# Patient Record
Sex: FEMALE | Race: WHITE
[De-identification: names, ages, dates, MRNs, and addresses within clinical notes are randomized per-mention and may not be internally consistent; named-entity substitution may affect disease eponyms.]

---

## 2021-12-14 ENCOUNTER — HOSPITAL ENCOUNTER (EMERGENCY)
Dept: HOSPITAL 35 - ER | Age: 61
LOS: 1 days | Discharge: PSYCHIATRIC OR SUBSTANCE USE FACILITY | End: 2021-12-15
Payer: OTHER GOVERNMENT

## 2021-12-14 ENCOUNTER — HOSPITAL ENCOUNTER (INPATIENT)
Dept: HOSPITAL 35 - SBH | Age: 61
LOS: 14 days | Discharge: HOME | DRG: 885 | End: 2021-12-28
Attending: PSYCHIATRY & NEUROLOGY | Admitting: PSYCHIATRY & NEUROLOGY
Payer: OTHER GOVERNMENT

## 2021-12-14 VITALS — HEIGHT: 64 IN | WEIGHT: 157 LBS | BODY MASS INDEX: 26.8 KG/M2

## 2021-12-14 DIAGNOSIS — F31.2: Primary | ICD-10-CM

## 2021-12-14 DIAGNOSIS — Z86.73: ICD-10-CM

## 2021-12-14 DIAGNOSIS — J44.9: ICD-10-CM

## 2021-12-14 DIAGNOSIS — K21.9: ICD-10-CM

## 2021-12-14 DIAGNOSIS — Z28.21: ICD-10-CM

## 2021-12-14 DIAGNOSIS — Z71.6: ICD-10-CM

## 2021-12-14 DIAGNOSIS — Z88.5: ICD-10-CM

## 2021-12-14 DIAGNOSIS — E87.1: ICD-10-CM

## 2021-12-14 DIAGNOSIS — F01.50: ICD-10-CM

## 2021-12-14 DIAGNOSIS — F41.9: ICD-10-CM

## 2021-12-14 DIAGNOSIS — I10: ICD-10-CM

## 2021-12-14 DIAGNOSIS — Z79.899: ICD-10-CM

## 2021-12-14 DIAGNOSIS — F10.97: ICD-10-CM

## 2021-12-14 DIAGNOSIS — Z79.82: ICD-10-CM

## 2021-12-14 DIAGNOSIS — F31.9: ICD-10-CM

## 2021-12-14 DIAGNOSIS — F91.1: Primary | ICD-10-CM

## 2021-12-14 DIAGNOSIS — Z88.6: ICD-10-CM

## 2021-12-14 DIAGNOSIS — F20.9: ICD-10-CM

## 2021-12-14 DIAGNOSIS — E78.5: ICD-10-CM

## 2021-12-14 DIAGNOSIS — Z20.822: ICD-10-CM

## 2021-12-14 PROCEDURE — 10880: CPT

## 2021-12-15 VITALS — DIASTOLIC BLOOD PRESSURE: 74 MMHG | SYSTOLIC BLOOD PRESSURE: 141 MMHG

## 2021-12-15 VITALS — DIASTOLIC BLOOD PRESSURE: 74 MMHG | SYSTOLIC BLOOD PRESSURE: 151 MMHG

## 2021-12-15 VITALS — SYSTOLIC BLOOD PRESSURE: 141 MMHG | DIASTOLIC BLOOD PRESSURE: 74 MMHG

## 2021-12-15 VITALS — SYSTOLIC BLOOD PRESSURE: 132 MMHG | DIASTOLIC BLOOD PRESSURE: 81 MMHG

## 2021-12-15 LAB
ANION GAP SERPL CALC-SCNC: 11 MMOL/L (ref 7–16)
BUN SERPL-MCNC: 4 MG/DL (ref 7–18)
CALCIUM SERPL-MCNC: 9.2 MG/DL (ref 8.5–10.1)
CHLORIDE SERPL-SCNC: 97 MMOL/L (ref 98–107)
CHOLEST SERPL-MCNC: 248 MG/DL (ref ?–200)
CO2 SERPL-SCNC: 24 MMOL/L (ref 21–32)
CREAT SERPL-MCNC: 0.5 MG/DL (ref 0.6–1)
GLUCOSE SERPL-MCNC: 87 MG/DL (ref 74–106)
HDLC SERPL-MCNC: 43 MG/DL (ref 40–?)
LDLC SERPL-MCNC: 174 MG/DL (ref ?–100)
POTASSIUM SERPL-SCNC: 3.9 MMOL/L (ref 3.5–5.1)
SERUM ASSESSMENT: (no result)
SODIUM SERPL-SCNC: 132 MMOL/L (ref 136–145)
TC:HDL: 5.8 RATIO
TRIGL SERPL-MCNC: 158 MG/DL (ref ?–150)
VIT B12 SERPL-MCNC: 1365 PG/ML (ref 193–986)
VLDLC SERPL CALC-MCNC: 32 MG/DL (ref ?–40)

## 2021-12-15 NOTE — NUR
ADMISSION SUMMARY
 
Pt was admitted to [facility] on 12/15/21 at 0037. Pt was cooperative with admission
process. Vital signs were obtained; pt was slighlty hypertensive with blood
pressure of 149/94, other vital signs were within normal limits. Skin
assessment performed; results unremarkable. RN will call DPOA in the morning
and obtain verbal consents.
 
During admission interview pt was emotionally labile; smiling and then crying.
Pt was oriented x4. Pt stated she feels guilt that she has to put her family
through another hospital admission. Pt was hypertalkative and very tangential.
Pt endorsed passive suicidal ideation with no plan. Pt admitted to attempting
suicide in 2017, but did not share how she attempted. Pt denied access to a
gun. Denied HI. Denied AVH, but stated that she does have "dream apparitions."

## 2021-12-15 NOTE — NUR
PATIENT WAS IN BED ASLEEP WHEN CARE ASSUMED. MORNING MEDICATION GIVEN AT BED
SIDE, WELL TOLERATED. MARY SERVED IN ROOM PER PROTOCOL TO SERVE NEW ADMITS
ROOM TRAYS. PATIENT IS ALERT, AND ORIENTED X 3-4, ABLE TO VOICE NEED. LCTA,
RESP EVEN/UNLABORED, NO SOA/CYANOSIS NOTED. BS+X4, ABD SOFT, NON-TENDER TO
TOUCH, APPETITE FAIR. PATIENT HAS SINCE GOTTEN UP, AMBULATE WITH ASSIST OF
ROLLER WALKER, GAIT SLIGHTLY UNSTEADY. PATIENT DENIES SUICIDAL/HOMICIDAL
IDEATION, SHE RATES BOTH DEPRESSION/ANXIETY 3/10. PATIENT REPORTS POOR SLEEP,
AFFECT IS LABILE, MOOD IS EUTHYMIC. PATIENT PARTICIPATES IN GROUP THERAPY. NO
SIGN OF ACUTE DISTRESS NOTED AT THIS TIME, WIIL MONITOR FOR SAFETY.

## 2021-12-16 VITALS — DIASTOLIC BLOOD PRESSURE: 75 MMHG | SYSTOLIC BLOOD PRESSURE: 125 MMHG

## 2021-12-16 VITALS — SYSTOLIC BLOOD PRESSURE: 125 MMHG | DIASTOLIC BLOOD PRESSURE: 75 MMHG

## 2021-12-16 VITALS — DIASTOLIC BLOOD PRESSURE: 73 MMHG | SYSTOLIC BLOOD PRESSURE: 167 MMHG

## 2021-12-16 VITALS — DIASTOLIC BLOOD PRESSURE: 102 MMHG | SYSTOLIC BLOOD PRESSURE: 172 MMHG

## 2021-12-16 LAB
EST. AVERAGE GLUCOSE BLD GHB EST-MCNC: 94 MG/DL
GLYCOHEMOGLOBIN (HGB A1C): 4.9 % (ref 4.8–5.6)
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
## 2021-12-16 NOTE — NUR
Alert and orientated X 4.  Denies SI/HI.  At times hyperverbal.  Requesting
shower several times in AM but then when offered one after AM group refused
stating she would wait until this evening.  Informed that may not be possible
d/t staffing and acuity of pts.  Verbalizes understanding.  Participating in
groups.  Breath sounds clear.  Reg HR auscultated.  Color pink with brisk
capillary refill and palpable peripheral pulses.  Independent with voiding.
Active bowel sounds over soft, rounded abdomen.  States she had BM yesterday.
Ambulates with walker with regular, steady gait.

## 2021-12-16 NOTE — NUR
ASSUMED CARE ON 12/15/21 @ 1900, A&OX3 CONFUSED AND RAPID SPEACH NOTED.
"REMIND ME TO TELL MY DAUGHTER TO SHOP @ [facility] FOR A LARGE CAT BED AND HAVE
IT DELIVERED BY DOOR DASH". COOPERATES WITH ASSESSMENT, HRRR BREATH SOUNDS CTA
BILAT, ABD N X4Q REPORTS LAST BM 12/14.  AMBULATES WITH A WALKER, HIGH FALL
RISK, RETIRED TO BED @ HS, BED ALARM SET, BED IS LOW TO GROUND WILL CONTINUE
TO MONITOR WITH FALL PROTOCOLS.

## 2021-12-17 VITALS — SYSTOLIC BLOOD PRESSURE: 136 MMHG | DIASTOLIC BLOOD PRESSURE: 86 MMHG

## 2021-12-17 VITALS — DIASTOLIC BLOOD PRESSURE: 83 MMHG | SYSTOLIC BLOOD PRESSURE: 145 MMHG

## 2021-12-17 VITALS — SYSTOLIC BLOOD PRESSURE: 169 MMHG | DIASTOLIC BLOOD PRESSURE: 113 MMHG

## 2021-12-17 NOTE — NUR
Resummed client care from overnight shift this am. Client was irritable,
hyperverbal, and somatic during this shift. Client presented oriented 3x,
though appeared disoriented to situation as she realizes she is in hospital,
but is making plans for a "non-profit organization which [her] daughters will
have no access to." Client presents with somatic symptoms, asking for multiple
medications, voicing concerns about her bottom being red (no redness noted),
and voicing anxiety while talking loquaciously and exhibiting hypomanic
thought process. Flight of ideas, and grandiosity present. Client denies any
depression or anxiety, but has became agitated and yelled at staff about phone
calls, medication requests, and her nonprofit organization, stating that she
needs to leave and take care of things.
 
Client trileptal increased to 300 mg BID, seroquel increased during this shift
for night time dose. Labs requested for this shift. Client concerns have been
voiced to Dr. Rush, her hospitalist, and Dr. Rush inspected her concerns of
redness along with this nurse to confirm no issue. Client informed of this.
Client concerns passed on to Dr. Rush, and Dr. Weiss for health and psych
concerns respectively.
 
Last BM was 12/17, though client requested MOM to help with bowel movements
this afternoon due to expressed bottom pain. Lung sounds clear; bowel
sounds present. Client is still continuing somatic behavior at this time and
is being redirected regularly by staff. No further concerns from client at
this time that have been unaddressed.

## 2021-12-17 NOTE — NUR
12-16-21 CARE TRANSFERRED 1900 OBSERVED PT WALKING IN HALLWAY WITH STEADY
GAIT. LATER PT AAOX4, VSS, RR EVEN AND NONLABORED ON RA, LUNGS CLEAR AND
DIMINISHED, HT RR, ABD SOFT/ACTIVE/NONTENDER. PT HYPERVERBAL WITH FLIGHT OF
IDEAS AND MOMENTS OF GRANDIOSE. PT DEMANDING AND IRRITABLE ABOUT SITUATION
WITH DAUGHTER. PT HAD NO DIFFICULTIES TAKING MEDICATION WHOLE WITH WATER. PT
BED WAS ADJUSTED FOR COMFORT, LOCKED AND LOWEST POSITION WITH ALARM ON. LATER
PT REPORTED PAIN AND WANTED OTC MEDICATION. UPON REASSESSMENT OF PAIN NOTED PT
RESTING WITH EYES CLOSED RESTING. PT WILL CONTINUE TO BE MONITOR PER [facility]
PROTOCOL.

## 2021-12-17 NOTE — NUR
12---4382--Saw patient in dayroom and stopped to speak to her after she
finished her breakfast.  She was looking at the chart for todays activities
and asked if she could go to her room.  I told her yes and that she needed to
be back in the dayroom by 9:00 for recreation therapy.  She was agreeable.

## 2021-12-17 NOTE — NUR
12---2301--Attended team meeting today for patient.  Has geen at [facility] several times.  Will talk to her re: this placement.

## 2021-12-18 VITALS — DIASTOLIC BLOOD PRESSURE: 82 MMHG | SYSTOLIC BLOOD PRESSURE: 145 MMHG

## 2021-12-18 VITALS — DIASTOLIC BLOOD PRESSURE: 81 MMHG | SYSTOLIC BLOOD PRESSURE: 137 MMHG

## 2021-12-18 LAB
ANION GAP SERPL CALC-SCNC: 7 MMOL/L (ref 7–16)
BUN SERPL-MCNC: 9 MG/DL (ref 7–18)
CALCIUM SERPL-MCNC: 9.8 MG/DL (ref 8.5–10.1)
CHLORIDE SERPL-SCNC: 93 MMOL/L (ref 98–107)
CO2 SERPL-SCNC: 29 MMOL/L (ref 21–32)
CREAT SERPL-MCNC: 0.7 MG/DL (ref 0.6–1)
GLUCOSE SERPL-MCNC: 99 MG/DL (ref 74–106)
POTASSIUM SERPL-SCNC: 4.3 MMOL/L (ref 3.5–5.1)
SODIUM SERPL-SCNC: 129 MMOL/L (ref 136–145)

## 2021-12-18 NOTE — NUR
12---6400--Patient was brought to my office.  I inquired if she had
taken her meds this AM.  Patient states she has taken them.  Patient is more
manic and hyperverbal than I have seen her in the days she has been here. She
acknowledges she is manicie and she feels great.
Attempted to talk to her about the note she put under my door.  It was
difficult to follow her conversation as she was very tangential and jumped
from subject to subject.  Prior to her going to the [facility] group I warned
Angie Harris about how manic she was and that she might not be able to sit
very long.
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
## 2021-12-18 NOTE — NUR
12---1430--Noise heard from in the dayroom.  Went to investigate.
Patient was once again agitated and was disrupting group.  Rec. therapist had
asked her multiple times to talk quietly, not talk or go to her room to talk
if she couldn't be quiet in group.  Patient was highly tangential and jumped
from topic to topic displlaying flight of ideas.  Her speech was rapid,
pressured and at time she displayed incoherent speech.  She appeared very
impulsive and highly grandiose stating she was going to "file a report with
the police when they came" (security had been called). She threatened all the
staff with law suits and would not allow anyone to interject anything to
attempt to defuse the situation.  She demanded she be allowed to call her
daughter and stated she wanted "out of here".  She is very impulsive and is
lacking in safety awareness (hers and the safety of others).  When the first
 arrived she continued to defy him and state she had a rght to
be here (in the day room) and she wasn't leaving.  A second guard arrived and
as they attempted to assist her to stand the patient was hanging onto the edge
of the table.  It was eventually pulled away from her and with a guard on each
side and hands under her arms they escorted her to her room.  All the while
she could be heard threatening to carlton the guards also. (Patient had PRN med at
approximately 1300 after beong argumentative and walking out of this
therapists group.)

## 2021-12-18 NOTE — NUR
12---5245--Note slid under my door this AM from patient that states:
RE: NEED DONE NOW--and the things that were listed were for her cat; and
needing all of her bills paid, etc.  Will talk to patient laterthis AM. (Note
was dated wth the correct date).

## 2021-12-18 NOTE — NUR
12-17-21 CARE TRANSFERRED 1900 OBSERVED PT SITTING IN DAY ROOM AT TABLE
WRITING. LATER PT AAOX4, ZONIA B/P 136/86, P 88, RR 16 EVEN AND NONLABORED ON
RA, PT DENIES PAIN AND SI/HI. PT COMMUNICATION AT A LOWER PACE TODAY, STILL
PRESENTING IN A MANIC STATE WITH FLIGHT OF IDEAS. PT VERBAL EXCHANGE WAS
APPROPRIATE. PT LUNGS CLEAR/DIMINSIHED, HT RR, ABD SOFT/ACTIVE. PT HAS BEEN
COOPERATIVE THROUGHOUT NURSING ASSESSMENT. DURING MEDICATION ADMIN PT HAD NO
DIFFICULTIES. LATER PT BED WAS ADJUSTED FOR COMFORT, LOCKED, LOW AND ALARM ON.
PT WILL CONTINUE TO BE MONITOR PER [facility] PROTOCOL.

## 2021-12-19 VITALS — SYSTOLIC BLOOD PRESSURE: 142 MMHG | DIASTOLIC BLOOD PRESSURE: 86 MMHG

## 2021-12-19 VITALS — SYSTOLIC BLOOD PRESSURE: 142 MMHG | DIASTOLIC BLOOD PRESSURE: 74 MMHG

## 2021-12-19 VITALS — DIASTOLIC BLOOD PRESSURE: 90 MMHG | SYSTOLIC BLOOD PRESSURE: 155 MMHG

## 2021-12-19 NOTE — NUR
RESUMMED CARE ROM OVERNIGHT SHIFT THIS AM, PATIENT IN ROOM LYING QUIET.
PATIENT ALERT ORIENTED TIMES 3 PATIENT DENIES SI/HI/AH/VH AT PRESENT. PATIENT
IS AT TIMES VERY HYPERVERBAL AND COMPLAINS ABOUT DIFFERENT THINGS. PATIENT ATE
BREAKFAST TOOK MEDICATION WITHOUT INCIDENCE. PATIENTS ABDOMEN SOFT BOWEL
SOUNDS PRESENT. PATIENTS LUNGS CLEAR PATIENT HAS A HISTORY OF COPD AND GETS
RESPIRATORY TREATMENTS. PATIENT STATES HER DEPRESSION AND ANXIETY IS A 2 NOW;
PATIENT PARTICPATES IN GROUPS. PATIENT HAS NOT DISPLAYED ANY BEHAVIORS AT
PRESENT. WILL CONTINUE TO MONITOR PATIENT FOR SAFETY AND BEHAVIORS.

## 2021-12-19 NOTE — NUR
12-18-21 CARE TRANSFERRED 1900. LATER PT AAOX4, VSS, RR EVEN AND NONLABORED
RA, LUNGS CLEAR/DIMINSHED, HT RR, ABD ACTIVE/SOFT/NONTENDER. PT DENIES PAIN
AND SI/HI. PT HYPERVERBAL BUT PACE SLOWER. PT REPORTS "GOING TO HAVE TO PLAY
THE GAME" PT OFTEN TRANSITION COVERSATION SHOWING FLIGHT OF IDEAS. PT %
HS SNACK. DURING MEDICATION ADMIN PT HAD NO DIFFICULTIES TAKING MEDICATION
WHOLE WITH WATER. OBSERVED PT MOVE FROM LYING TO SITTING POSITON WITH EASE, PT
HAS FULL ROM WITH STEADY GAIT. PT HAS BEEN USING COMMON MANNERS WITH REQUEST
THIS EVENING. PT BED WAS ADJUSTED FOR COMFORT, PT WILL CONTINUE TO BE MONITOR
PER [facility] PROTOCOL.

## 2021-12-20 VITALS — SYSTOLIC BLOOD PRESSURE: 171 MMHG | DIASTOLIC BLOOD PRESSURE: 92 MMHG

## 2021-12-20 VITALS — SYSTOLIC BLOOD PRESSURE: 146 MMHG | DIASTOLIC BLOOD PRESSURE: 83 MMHG

## 2021-12-20 NOTE — NUR
12-19-21 CARE TRANSFERRED 1900 OBSERVED PT WALKING IN HALLWAY. LATER PT AAOX4,
VSS, RR EVEN AND NONLABORED ON RA, PT DENIES PAIN AND SI/HI/VAH. PT
HYPERVERBAL AND SOMETIMES HAS FLIGHT OF IDEAS WITH GRANDIOSE, HAVE NOTED PT
COMMUNICATION EXCHANGE PACE HAS SLOWED. PT PLEASANT, CALM AND COOPERATIVE WITH
THIS WRITER, OBSERVED PT GETTING IRRITABLE WITH  ANOTHER STAFF MEMBER. LUNGS
CLEAR DIMINISHED, HT RR, ABD SOFT AND ACTIVE. DURNG MEDICATION ADMIN PT HAD NO
DIFFICULTIE TAKING MEDICATION WHOLE. LATER PT REPORTED BACK PAIN AND PRN
MEDICATIN ADMIN. UPON REASSESSMENT PT RESTING WITH EYES CLOSED. LATER NOTED PT
WALKING HALLWAYS, THEN TO ROOM AND RESTING IN BED. PT WILL CONTINUE TO BE
MONITOR PER [facility] PROTOCOL.

## 2021-12-20 NOTE — NUR
12---1845--Patient was in the dayroom waiting for group to start.  I
inquired how she was feeling today and she stated fine.  Her speech was much
less pressured and rapid than over the weekend.  She states her daughter Mery
(who is her DPOA) is no longer mad at her.

## 2021-12-20 NOTE — NUR
12---Call to [facility] where the patient was placed previously.
Talked to the adminisrator, Fausto.  He asked me to fax him a referral packet
and he would look at it.  Information gathered and faxed to [facility].

## 2021-12-20 NOTE — NUR
COOPERATIVE WITH AM ASSESSMENT AND MEDICATION PASS-UPON INITIAL APPROACH IS
SITTING AT TABLE IN DAYROOM VISITING WITH FEMALE PEER. SPEECH REMAINS
PRESSURED AT TIMES. CONVERSATION IS APPROPRIATE AND GOAL DIRECTED. AT NURSING
STATION X 2 SO FAR THIS SHIFT ASKING FOR PAPER AND OBSERVED TO BE MAKING
COPIOUS PAGES OF NOTES AND WRITINGS WHICH ARE DISORGANIZED. USING ROLLER
WALKER TO AMBULATE FROM ROOM TO DAYROOM. GAIT IS STEADY WITH ASSISITVE DEVICE.
REQUESTED AND RECEIVED IBUPROFEN 600MG PO PRN AT 1445 FOR MID BACK PAIN RATED
A 6 ON 1-10 SCALE.
 
TO

## 2021-12-21 VITALS — DIASTOLIC BLOOD PRESSURE: 79 MMHG | SYSTOLIC BLOOD PRESSURE: 167 MMHG

## 2021-12-21 VITALS — SYSTOLIC BLOOD PRESSURE: 138 MMHG | DIASTOLIC BLOOD PRESSURE: 71 MMHG

## 2021-12-21 VITALS — DIASTOLIC BLOOD PRESSURE: 71 MMHG | SYSTOLIC BLOOD PRESSURE: 138 MMHG

## 2021-12-21 LAB
ANION GAP SERPL CALC-SCNC: 10 MMOL/L (ref 7–16)
BUN SERPL-MCNC: 12 MG/DL (ref 7–18)
CALCIUM SERPL-MCNC: 9.7 MG/DL (ref 8.5–10.1)
CHLORIDE SERPL-SCNC: 91 MMOL/L (ref 98–107)
CO2 SERPL-SCNC: 27 MMOL/L (ref 21–32)
CREAT SERPL-MCNC: 0.7 MG/DL (ref 0.6–1)
GLUCOSE SERPL-MCNC: 112 MG/DL (ref 74–106)
POTASSIUM SERPL-SCNC: 4.2 MMOL/L (ref 3.5–5.1)
SODIUM SERPL-SCNC: 128 MMOL/L (ref 136–145)

## 2021-12-21 NOTE — NUR
Pt requesting ibuprofen for low back pain 3/10.  Would also like hydroxine for
anxiety 3/10.  None ordered.

## 2021-12-21 NOTE — NUR
12-20-21 CARE TRANSFERRED 1900. PT AAOX4, VSS, RR EVEN AND NONLABORED ON RA.
PT LUNGS CLEAR/DIMINISHED, HT RR, ABD SOFT. PT DENIES SI/HI AND PAIN. PT
HYPERVERBAL WITH FLIGHT OF IDEAS BUT COOPERATIVE.

## 2021-12-21 NOTE — NUR
RESUMMMED CARE FROM OVERNIGHT SHIFT THIS AM,  PATIENT IN ROOM SITTING QUIET ON
BED. PATIENT ALERT ORIENTED TIMES 4 PATIENT DENIES SI/HI/AH/VH AT PRESENT.
PATIENT ATE BREAKFAST TOOK MEDICATION WITHOUT INCIDENCE; PATIENTS ABDOMEN SOFT
BOWEL SOUNDS PRESENT. PATIENTS LUNGS CLEAR PATIENT IS HYPERVERBAL TALKING TO
OTHER PATIENTS. PATIENT WANTS TO GO HOME TO TAKE CARE OF BILLS AND LEGAL
MATTERS. PATIENT HAS NOT DISPLAYED ANY BEHAVIORS PARTICIPATES IN GROUPS. WILL
CONTINUE TO MONITOR PATIENT FOR SAFETY AND BEHAVIORS.
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
## 2021-12-21 NOTE — NUR
RT Progress Note- Dorcas has been present in most recreation therapy groups
since her admission. She displays symptoms of kristen during participation;
rapid and unorganized speech, a flight of ideas, exaggerated stories, etc. She
has required redirection for interrupting others and on one occasion became
defiant to redirection and was escorted away from group. This has improved
throughout the past 2 days of review. CTRS and RT team will continue to
encourage participation in groups, improved social boundaries, and insight to
problematic behaviors.

## 2021-12-22 VITALS — DIASTOLIC BLOOD PRESSURE: 108 MMHG | SYSTOLIC BLOOD PRESSURE: 148 MMHG

## 2021-12-22 VITALS — SYSTOLIC BLOOD PRESSURE: 141 MMHG | DIASTOLIC BLOOD PRESSURE: 74 MMHG

## 2021-12-22 NOTE — NUR
COMPLIENT WITH REQUESTS FROM NURSING STAFF THIS AM-SITTING QUIETLY INDAYROOM
INTERACTING WITH PEERS,ATTENDING GROUP-STRUCTURES FREE TIME WRITING NOTES AND
LETTERS-COLORING ART WORK. GAIT STEADY WITHOUT ASSISTIVE DEVICES.

## 2021-12-22 NOTE — NUR
12-21-21 CARE TRANSFERRED 1900 OBSERVED PT WALKING IN HALLWAY. LATER PT AAOX4,
VSS, RR EVEN AND NONLAORED ON RA. LUNGS CLEAR/DIMINISHED, HT RR, ABD
SOFT/ACTIVE/NONTENDER. PT HYPERVERBAL WITH FLIGHT OF IDEAS ABOUT FINANCIAL
THEFT AND OBTAINING . PT COOPERATIVE DURING NURSING ASSESSMENT. PT HAD
NO DIFFICULTING TAKING MEDICATION WHOLE WITH WATER. PT WILL CONTINUE TO BE
MONITOR PER [facility] PROTOCOL.

## 2021-12-22 NOTE — NUR
12---1892--Attended team meeting for patient this date.  Patient
continues to be manicy.  She will be on a fluid restrictiom from now on.
Doctor continuing to adjust medications.

## 2021-12-23 VITALS — DIASTOLIC BLOOD PRESSURE: 48 MMHG | SYSTOLIC BLOOD PRESSURE: 152 MMHG

## 2021-12-23 VITALS — SYSTOLIC BLOOD PRESSURE: 177 MMHG | DIASTOLIC BLOOD PRESSURE: 100 MMHG

## 2021-12-23 VITALS — SYSTOLIC BLOOD PRESSURE: 114 MMHG | DIASTOLIC BLOOD PRESSURE: 54 MMHG

## 2021-12-23 NOTE — NUR
COOPERATIVE WITH AM ASSESSMENTS AND REQUESTS FROM NURSING STAFF-DID REPORT
SOME CONSTIPATION AND TOOK AM COLACE STATING SHE THOUGHT THIS WOULD HELP AND
DENIES NEED FOR ADDITIONAL LAXATIVES OR INTERVENTIONS. FULL RANGE AFFECT-SOME
MILD PRESSURED SPEECH NOTED.STRUCTURES FREE TIME COLORING OR DOING ACTIVITIES
IN DAYROOM. NO A/V HALLUCINATIONS/PSYCHOSIS,DELUSIONAL THINKING NOTED OR
REPORTED. GAIT STEADY WITHOUT ASSISITVE DEVICES.

## 2021-12-23 NOTE — NUR
Dorcas was alert and oriented x4 this shift. She was noted laying down in her
room at the start of the shift. She presented as calm, cooperative, and
pleasant but her thought process was disorganized and tangential. She also
appeared slightly hyperverbal. She denied SI/HI/HA. She was medication
compliant, taking pills whole without difficulty. She appeared to have a
positive outlook regarding her admission. She denied physical complaints and
appeared to rest comfortably throughout the night. Will continue to monitor.

## 2021-12-24 VITALS — SYSTOLIC BLOOD PRESSURE: 151 MMHG | DIASTOLIC BLOOD PRESSURE: 90 MMHG

## 2021-12-24 VITALS — SYSTOLIC BLOOD PRESSURE: 131 MMHG | DIASTOLIC BLOOD PRESSURE: 101 MMHG

## 2021-12-24 LAB
ALBUMIN SERPL-MCNC: 3.4 G/DL (ref 3.4–5)
ANION GAP SERPL CALC-SCNC: 12 MMOL/L (ref 7–16)
BUN SERPL-MCNC: 12 MG/DL (ref 7–18)
CALCIUM SERPL-MCNC: 9.2 MG/DL (ref 8.5–10.1)
CHLORIDE SERPL-SCNC: 96 MMOL/L (ref 98–107)
CO2 SERPL-SCNC: 24 MMOL/L (ref 21–32)
CREAT SERPL-MCNC: 0.6 MG/DL (ref 0.6–1)
GLUCOSE SERPL-MCNC: 90 MG/DL (ref 74–106)
PHOSPHATE SERPL-MCNC: 4.4 MG/DL (ref 2.5–4.9)
POTASSIUM SERPL-SCNC: 3.5 MMOL/L (ref 3.5–5.1)
SODIUM SERPL-SCNC: 132 MMOL/L (ref 136–145)

## 2021-12-24 NOTE — NUR
Hyperverbal this AM.  Wanting to go home.  Speaking on phone with daughter and
then demanding that she speak with Dr. Weiss.  Dr. Weiss declined
discharge.  Breath sounds clear.  Reg HR auscultated.  Color pink with brisk
capillary refill and palpable peripheral pulses.  Yellow urine per toilet.
Requested milk of magnesia with good results.  Then demanding to have Cdiff
and wanting to put stool on ice.  Ambulates with walker with slow steady gait.
Ibuprofen and tylenol given for back pain that she states is r/t stress.
Currently in day room eating dinner with peers.  No s/o distress.
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
## 2021-12-25 VITALS — DIASTOLIC BLOOD PRESSURE: 95 MMHG | SYSTOLIC BLOOD PRESSURE: 117 MMHG

## 2021-12-25 VITALS — DIASTOLIC BLOOD PRESSURE: 113 MMHG | SYSTOLIC BLOOD PRESSURE: 139 MMHG

## 2021-12-25 VITALS — SYSTOLIC BLOOD PRESSURE: 117 MMHG | DIASTOLIC BLOOD PRESSURE: 95 MMHG

## 2021-12-25 VITALS — DIASTOLIC BLOOD PRESSURE: 86 MMHG | SYSTOLIC BLOOD PRESSURE: 136 MMHG

## 2021-12-25 NOTE — NUR
RESUMMED CARE FROM OVERNIGHT SHIFT THIS AM, PATIENT ALERT ORIENTED TIMES 4.
PATIENT DENIES SI/HI/AH/VH AT PRESENT, PATIENT ATE BREAKFAST TOOK MEDICATION
WITHOUT INCIDENCE. PATIENT DENIES SI/HI/AH/VH AT PRESENT, PATIENTS ABDOMEN
SOFT BOWEL SOUNDS PRESENT, PATIENTS LUNGS CLEAR. PATIENT PARTICPATED IN ALL
GROUPS. PATIENT PLEASANT COOPERATIVE PATIENT COMPLAINED OF KNEE PAIN AND GIVEN
TYLENOL 650 MG. PATIENT DENIES DEPRESSION OR ANIETY WILL CONTINUE TO MONITOR
PATIENT FOR SAFETY AND BEHAVIORS.

## 2021-12-25 NOTE — NUR
12-24-21 CARE TRANSFERRED 1900. LATER PT AAOX4, VSS, RR EVEN AND NONLABORED ON
RA, LUNGS CLEAR/DIMINISHED, HT RR, ABD SOFT/ACTIVE/NONTENDER. PT DENIES SI/HI
AND PAIN. PT STILL HYPERVERBAL BUT COMMUNIATION EXHANGE IMPROVING, PT CALM AND
COOPERATIVE. DURING MEDICATION ADMIN PT HAD NO DFFICULTIES TAKING WHOLE WITH
WATER. PT WILL CONTINUE TO BE MONITOR PER [facility] PROTOCOL.

## 2021-12-26 VITALS — SYSTOLIC BLOOD PRESSURE: 161 MMHG | DIASTOLIC BLOOD PRESSURE: 82 MMHG

## 2021-12-26 VITALS — SYSTOLIC BLOOD PRESSURE: 159 MMHG | DIASTOLIC BLOOD PRESSURE: 91 MMHG

## 2021-12-26 VITALS — DIASTOLIC BLOOD PRESSURE: 91 MMHG | SYSTOLIC BLOOD PRESSURE: 159 MMHG
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
## 2021-12-26 NOTE — NUR
Dorcas was alert and oriented x4 this shift. She presented as calm,
cooperative, and appropriate. Pt can be hyperverbal at times but appears less
so than previous days and also appears to be more calm. Pt was able to remove
herself from stressful situations appropriately, regarding another pt on the
unit, and was able to make her needs known appropriately. She denied physical
symptoms this shift and appeared comfortable throughout the day. She was
medication and meal compliant throughout the day, without difficulty. She
participated in groups and spent most of the shift in the dayroom. Pt denied
SI/HI/HA and remained safe throughout the day. Will continue to monitor.

## 2021-12-27 VITALS — DIASTOLIC BLOOD PRESSURE: 78 MMHG | SYSTOLIC BLOOD PRESSURE: 159 MMHG

## 2021-12-27 VITALS — DIASTOLIC BLOOD PRESSURE: 94 MMHG | SYSTOLIC BLOOD PRESSURE: 177 MMHG

## 2021-12-27 VITALS — SYSTOLIC BLOOD PRESSURE: 155 MMHG | DIASTOLIC BLOOD PRESSURE: 78 MMHG

## 2021-12-27 VITALS — SYSTOLIC BLOOD PRESSURE: 177 MMHG | DIASTOLIC BLOOD PRESSURE: 94 MMHG

## 2021-12-27 NOTE — NUR
12---7903--Treatment team attended for patient this date. She will DC
to her apartment in [place] or to a nursing home oif one can be located.
Will call patients DPOA today to discuss woith her and the doctor.

## 2021-12-27 NOTE — NUR
12---1600--Return call from Fausto () at [facility] where patient previously has lived.  They have denied this
patient as she has a large bill still outstanding with them.  Will let doctor,
patient and team know today (12-).

## 2021-12-27 NOTE — NUR
Dorcas was alert and oriented x4 this shift. She spent most of the day in the
dayroom and participated in groups. She was medication and meal compliant,
without difficulty. She presented as anxious as she voiced multiple concerns
and things she wanted addressed on an outpatient basis such as seing a
pediatrist. She voiced c/o generalized pain to the left side of her body which
is chronic per pt; tylenol and ibuprofen given with effectiveness. She denied
symptoms of depression and denied other physical complaints at this time. Pt
voiceed being "stressed and overwhelmed" regarding "ongoing issues with
my oldest daughter and covid", when discussing being discharged tomorrow.
Overall, pt voiced improvements. Will continue to monitor.

## 2021-12-27 NOTE — NUR
PATIENT CARE WAS RESUMED AT 1900.SHE IS ALERT AND ORIENTED. AMBULATES AND ABLE
TO VERBALISE HER NEEDS. SHE DENIES SI/AVH/HI. SHE IS CONTINET OF BOWEL AND
BLADDER. SHE TOOK HER MEDS WHOLE. LUNGS ARE CLEAR. SHE DENIES ANY CONCERNS AT
THIS TIME. SHE C/O PAINS AND PRN IBUPROFEN GIVEN WITH SOME GOOD EFFECT.SHE IS
RESTING CALM IN BED. BED IS LOW, LOCKED AND NONE SKID SOCKS ON. [id number]
CHECK IS ONGOING CONTINUE CARE

## 2021-12-28 VITALS — SYSTOLIC BLOOD PRESSURE: 148 MMHG | DIASTOLIC BLOOD PRESSURE: 88 MMHG

## 2021-12-28 VITALS — DIASTOLIC BLOOD PRESSURE: 94 MMHG | SYSTOLIC BLOOD PRESSURE: 177 MMHG

## 2021-12-28 NOTE — NUR
PATIENT CARE WAS RESUMED AT 1900. SHE IA ALERT AND ORIENTED X3 WITH SOME
FORFETFULNESS. SHE AMBULATES AND CONTININT OF BOWEL AND BLADDER. SHE DENIES
SI/AVH/HI. SHE TOOK HER MED WHOLE AND ABLE TO VERBALIZE HER NEEDS. LUNGS ARE
CLEAR BS ACTIVE X4 QUAD. PATIENT CONTINUES TO ASKED NUSE TO EXPLAIN HER
MEDICATION WITHOUT ENLISTING UNDERSTANDING OF THE MEDICATION. SHE HAS A NONE
SKID SOCKS ON, BED IS LOW AND LOCKED. Q12 MINUTES CHECKS ARE ONGOING CONTINUE
CARE.

## 2021-12-29 NOTE — D
[facility]
[address]
[place]   [address]                     DISCHARGE SUMMARY             
_______________________________________________________________________________
 
Name:       GABRIELA DILLARD             Room #:         521A-A      [facility] [place]  
M.R.#:      [id number]                       Account #:      [id number]  
Admission:  12/15/21    Attend Phys:    Long Weiss DO
Discharge:  12/28/21    Date of Birth:  [birth year]  
                                                          Report #: [id number]
                                                                    [id number] 
_______________________________________________________________________________
THIS REPORT FOR:  
 
cc:  ALBERTO GAXIOLA            
     Physician not on staff                                               
     Long Weiss DO                                        ~
DATE OF SERVICE: 12/28/2021
 
INPATIENT PSYCHIATRIC DISCHARGE SUMMARY
 
ATTENDING PSYCHIATRIST:  Long Weiss DO
 
MEDICAL CONSULTANT:  Peewee Tran MD
 
DISCHARGE DIAGNOSES:  Bipolar 1 disorder, most recent episode manic with 
psychotic features, tobacco use disorder.
 
SECONDARY DIAGNOSES:
1.  Hyponatremia, likely due to poor intake, syndrome of inappropriate 
antidiuretic hormone ruled out.  She does not need a fluid restriction.
2.  Hypertension, on Coreg with parameters.
3.  Hyperlipidemia.
4.  Chronic obstructive pulmonary disease.
5.  Tobacco use disorder.
6.  Gastrointestinal prophylaxis.
 
DISPOSITION:  The patient is discharging to her home in [place], [place].  The 
patient has aftercare with [facility] in [place], where she sees
Guille BEDOLLA.
 She has a primary care physician that she will see.
 
DISCHARGE MEDICATIONS:  The patient's discharge medications are Seroquel  
mg tabs, take 2 tabs, 800 mg oral at bedtime for psychosis and mood 
stabilization.  Depakote, she is on 750 mg extended release at bedtime.  She had
a level of 62 early a.m. on the day of discharge, which is therapeutic range.  
Trazodone 150 mg at bedtime for sleep, MiraLax 17 grams daily for bowel 
motility, atorvastatin 20 mg daily for hyperlipidemia, aspirin 81 mg oral daily 
for hypertension., Coreg 6.25 mg oral twice daily.  She can take Protonix or 
omeprazole 40 mg oral twice daily for GI prophylaxis and GERD.  Those were all 
her discharge medications.
 
DIET:  Regular.
 
ACTIVITY LEVEL:  As tolerated.  No alcohol, no illicit drugs.  The patient was 
given a script for Nicoderm patch for a week.  Smoking cessation recommended.
 
LABORATORY DATA:  This admission, urine osmolality 165.  Chemistries this 
 
 
 
[facility]
[address]
[place]   [address]                     DISCHARGE SUMMARY             
_______________________________________________________________________________
 
Name:       GABRIELA DILLARD             Room #:         521A-A      [facility] [place]  
[facility]#:      [id number]                       Account #:      [id number]  
Admission:  12/15/21    Attend Phys:    Long Weiss DO
Discharge:  12/28/21    Date of Birth:  [birth year]  
                                                          Report #: [id number]
                                                                    [id number] 
_______________________________________________________________________________
admission, sodium recently on 12/24 is 132, potassium 3.5, chloride 96, 
bicarbonate 24, anion gap 12, BUN 12, creatinine 0.6, estimated , glucose
90.  A1c 4.9, calcium 9.2, phosphorus 4.4, albumin 3.4.  Triglycerides 158, 
total cholesterol 248, , HDL 43, B12 of 1365.  TSH normal at 3.241.  No 
radiology this admission.  No microbiology.
 
REASON FOR ADMISSION:  Back on 12/15 or so, 61-year-old  female sent to
us from [facility].  The patient was frankly manic and is felt to need 
psychiatric hospitalization.
 
HOSPITAL COURSE:  The patient was admitted to Geriatric Psychiatry Unit, 
admitted there.  I made early contact with Guille Bassett his
outpatient provider.
Apparently, the patient over the last 1-2 years had spent significant time at a 
nursing facility in [place], [place].  The patient was back, living 
independently.  She does have compliance issues.  The patient's [employer] mental status exam score was a 27/30 this admission.  There was 
suggestion of patient having an alcohol-related dementia.  Unfortunately, 
neuropsych testing was not available to better answer this question.  If she 
does have dementia, it is quite mild.  We did make a try of placing her in 
[facility], which declined her.  There were not other nursing facilities that 
were open to taking the patient.  Certainly, she is at risk for noncompliance 
decompensation.  My recommendation is a [facility], provide her with case 
management wraparound services.  I think over the long run, she will need 
placement in a nursing facility.  Her daughter, Mery, was involved and is her 
DPOA and is in agreement with discharge and understanding of the factors 
influencing the situation.
 
PHYSICAL EXAMINATION:
VITAL SIGNS:  On the day of discharge, temperature 36.4, pulse 90, respirations 
19, /88, weight 71.214 kg, BMI 26.9.
MUSCULOSKELETAL:  Assisted gait with walker, wearing glasses.  Fair grooming and
hygiene.
 
MENTAL STATUS EXAMINATION:  Well-developed, slightly older than age-appearing 
 female.  Attention intact.  Concentration was intact.  Speech normal 
rate, volume and tone.  Thought process, linear and goal directed.  Thought 
content, focused on discharge. 
Denied suicidal or homicidal ideation, auditory
or visual type hallucinations and helplessness.  Mood and affect okay, 
congruent, euthymic.  Memory not formally tested on day of discharge.  Insight 
and judgment fair to limited.  Fund of knowledge, no greater than average.
 
 
 
 
[facility]
[address]
[place]   [address]                     DISCHARGE SUMMARY             
_______________________________________________________________________________
 
Name:       GABRIELA DILLARD             Room #:         521A-A      [facility] [place]  
M.R.#:      [id number]                       Account #:      [id number]  
Admission:  12/15/21    Attend Phys:    Long Weiss DO
Discharge:  12/28/21    Date of Birth:  [birth year]  
                                                          Report #: [id number]
                                                                    [id number] 
_______________________________________________________________________________
PROGNOSIS:  For this patient is guarded given compliance history.  Wraparound 
services will be essential in maintaining her stability this coming year.
 
 
 
 
 
 
 
 
 
 
 
 
 
 
 
 
 
 
 
 
 
 
 
 
 
 
 
 
 
 
 
 
 
 
 
 
 
 
 
 
 
 
  <ELECTRONICALLY SIGNED>
   By: Long Weiss DO        
  12/29/21     0944
D: 12/28/21 1936                           _____________________________________
T: 12/28/21 2056                           Long Weiss DO          /nt